# Patient Record
Sex: MALE | URBAN - METROPOLITAN AREA
[De-identification: names, ages, dates, MRNs, and addresses within clinical notes are randomized per-mention and may not be internally consistent; named-entity substitution may affect disease eponyms.]

---

## 2023-10-13 ENCOUNTER — LAB (OUTPATIENT)
Dept: LAB | Facility: LAB | Age: 28
End: 2023-10-13

## 2023-10-13 DIAGNOSIS — Z00.00 ENCOUNTER FOR GENERAL ADULT MEDICAL EXAMINATION WITHOUT ABNORMAL FINDINGS: Primary | ICD-10-CM

## 2023-10-13 PROCEDURE — 36415 COLL VENOUS BLD VENIPUNCTURE: CPT

## 2023-10-13 PROCEDURE — 86787 VARICELLA-ZOSTER ANTIBODY: CPT

## 2023-10-13 PROCEDURE — 86481 TB AG RESPONSE T-CELL SUSP: CPT

## 2023-10-13 PROCEDURE — 86765 RUBEOLA ANTIBODY: CPT

## 2023-10-13 PROCEDURE — 86735 MUMPS ANTIBODY: CPT

## 2023-10-13 PROCEDURE — 86317 IMMUNOASSAY INFECTIOUS AGENT: CPT

## 2023-10-13 PROCEDURE — 86706 HEP B SURFACE ANTIBODY: CPT

## 2023-10-14 LAB
HBV SURFACE AB SER-ACNC: >1000 MIU/ML
MEV IGG SER QL IA: POSITIVE
MUMPS IGG ANTIBODY INDEX: 1.2 IA
MUV IGG SER IA-ACNC: POSITIVE
RUBEOLA IGG ANTIBODY INDEX: 4.9 IA
RUBV IGG SERPL IA-ACNC: 1.7 IA
RUBV IGG SERPL QL IA: POSITIVE
VARICELLA ZOSTER IGG INDEX: 0.6 IA
VZV IGG SER QL IA: NEGATIVE

## 2023-10-15 LAB
NIL(NEG) CONTROL SPOT COUNT: NORMAL
PANEL A SPOT COUNT: 0
PANEL B SPOT COUNT: 0
POS CONTROL SPOT COUNT: NORMAL
T-SPOT. TB INTERPRETATION: NEGATIVE

## 2024-06-26 ENCOUNTER — APPOINTMENT (OUTPATIENT)
Dept: CARDIOLOGY | Facility: HOSPITAL | Age: 29
End: 2024-06-26
Payer: OTHER GOVERNMENT

## 2024-06-26 ENCOUNTER — HOSPITAL ENCOUNTER (EMERGENCY)
Facility: HOSPITAL | Age: 29
Discharge: HOME | End: 2024-06-26
Attending: EMERGENCY MEDICINE
Payer: OTHER GOVERNMENT

## 2024-06-26 VITALS
HEART RATE: 52 BPM | BODY MASS INDEX: 34.01 KG/M2 | SYSTOLIC BLOOD PRESSURE: 139 MMHG | HEIGHT: 74 IN | RESPIRATION RATE: 13 BRPM | TEMPERATURE: 97.5 F | WEIGHT: 265 LBS | DIASTOLIC BLOOD PRESSURE: 83 MMHG | OXYGEN SATURATION: 100 %

## 2024-06-26 DIAGNOSIS — H81.399 PERIPHERAL VERTIGO, UNSPECIFIED LATERALITY: Primary | ICD-10-CM

## 2024-06-26 LAB
ALBUMIN SERPL-MCNC: 4.8 G/DL (ref 3.5–5)
ALP BLD-CCNC: 78 U/L (ref 35–125)
ALT SERPL-CCNC: 45 U/L (ref 5–40)
ANION GAP SERPL CALC-SCNC: 10 MMOL/L
AST SERPL-CCNC: 30 U/L (ref 5–40)
ATRIAL RATE: 66 BPM
BASOPHILS # BLD AUTO: 0.08 X10*3/UL (ref 0–0.1)
BASOPHILS NFR BLD AUTO: 1 %
BILIRUB SERPL-MCNC: 0.3 MG/DL (ref 0.1–1.2)
BODY SURFACE AREA: 2.51 M2
BUN SERPL-MCNC: 15 MG/DL (ref 8–25)
CALCIUM SERPL-MCNC: 9.7 MG/DL (ref 8.5–10.4)
CHLORIDE SERPL-SCNC: 105 MMOL/L (ref 97–107)
CO2 SERPL-SCNC: 22 MMOL/L (ref 24–31)
CREAT SERPL-MCNC: 0.9 MG/DL (ref 0.4–1.6)
EGFRCR SERPLBLD CKD-EPI 2021: >90 ML/MIN/1.73M*2
EOSINOPHIL # BLD AUTO: 0.26 X10*3/UL (ref 0–0.7)
EOSINOPHIL NFR BLD AUTO: 3.2 %
ERYTHROCYTE [DISTWIDTH] IN BLOOD BY AUTOMATED COUNT: 13 % (ref 11.5–14.5)
GLUCOSE SERPL-MCNC: 115 MG/DL (ref 65–99)
HCT VFR BLD AUTO: 44.3 % (ref 41–52)
HGB BLD-MCNC: 14.7 G/DL (ref 13.5–17.5)
IMM GRANULOCYTES # BLD AUTO: 0.02 X10*3/UL (ref 0–0.7)
IMM GRANULOCYTES NFR BLD AUTO: 0.2 % (ref 0–0.9)
LYMPHOCYTES # BLD AUTO: 2.64 X10*3/UL (ref 1.2–4.8)
LYMPHOCYTES NFR BLD AUTO: 32.2 %
MCH RBC QN AUTO: 29.3 PG (ref 26–34)
MCHC RBC AUTO-ENTMCNC: 33.2 G/DL (ref 32–36)
MCV RBC AUTO: 88 FL (ref 80–100)
MONOCYTES # BLD AUTO: 0.65 X10*3/UL (ref 0.1–1)
MONOCYTES NFR BLD AUTO: 7.9 %
NEUTROPHILS # BLD AUTO: 4.56 X10*3/UL (ref 1.2–7.7)
NEUTROPHILS NFR BLD AUTO: 55.5 %
NRBC BLD-RTO: 0 /100 WBCS (ref 0–0)
P AXIS: 48 DEGREES
P OFFSET: 196 MS
P ONSET: 147 MS
PLATELET # BLD AUTO: 420 X10*3/UL (ref 150–450)
POTASSIUM SERPL-SCNC: 4.3 MMOL/L (ref 3.4–5.1)
PR INTERVAL: 148 MS
PROT SERPL-MCNC: 7.9 G/DL (ref 5.9–7.9)
Q ONSET: 221 MS
QRS COUNT: 11 BEATS
QRS DURATION: 92 MS
QT INTERVAL: 374 MS
QTC CALCULATION(BAZETT): 392 MS
QTC FREDERICIA: 386 MS
R AXIS: 100 DEGREES
RBC # BLD AUTO: 5.02 X10*6/UL (ref 4.5–5.9)
SODIUM SERPL-SCNC: 137 MMOL/L (ref 133–145)
T AXIS: -15 DEGREES
T OFFSET: 408 MS
VENTRICULAR RATE: 66 BPM
WBC # BLD AUTO: 8.2 X10*3/UL (ref 4.4–11.3)

## 2024-06-26 PROCEDURE — 99283 EMERGENCY DEPT VISIT LOW MDM: CPT

## 2024-06-26 PROCEDURE — 80053 COMPREHEN METABOLIC PANEL: CPT | Performed by: EMERGENCY MEDICINE

## 2024-06-26 PROCEDURE — 2500000004 HC RX 250 GENERAL PHARMACY W/ HCPCS (ALT 636 FOR OP/ED): Performed by: EMERGENCY MEDICINE

## 2024-06-26 PROCEDURE — 2500000001 HC RX 250 WO HCPCS SELF ADMINISTERED DRUGS (ALT 637 FOR MEDICARE OP): Performed by: EMERGENCY MEDICINE

## 2024-06-26 PROCEDURE — 96361 HYDRATE IV INFUSION ADD-ON: CPT

## 2024-06-26 PROCEDURE — 36415 COLL VENOUS BLD VENIPUNCTURE: CPT | Performed by: EMERGENCY MEDICINE

## 2024-06-26 PROCEDURE — 85025 COMPLETE CBC W/AUTO DIFF WBC: CPT | Performed by: EMERGENCY MEDICINE

## 2024-06-26 PROCEDURE — 93005 ELECTROCARDIOGRAM TRACING: CPT

## 2024-06-26 PROCEDURE — 93246 EXT ECG>7D<15D RECORDING: CPT

## 2024-06-26 PROCEDURE — 96360 HYDRATION IV INFUSION INIT: CPT

## 2024-06-26 RX ORDER — MECLIZINE HYDROCHLORIDE 25 MG/1
25 TABLET ORAL ONCE
Status: COMPLETED | OUTPATIENT
Start: 2024-06-26 | End: 2024-06-26

## 2024-06-26 RX ORDER — MECLIZINE HYDROCHLORIDE 25 MG/1
25 TABLET ORAL 3 TIMES DAILY PRN
Qty: 20 TABLET | Refills: 0 | Status: SHIPPED | OUTPATIENT
Start: 2024-06-26 | End: 2024-07-03

## 2024-06-26 ASSESSMENT — COLUMBIA-SUICIDE SEVERITY RATING SCALE - C-SSRS
6. HAVE YOU EVER DONE ANYTHING, STARTED TO DO ANYTHING, OR PREPARED TO DO ANYTHING TO END YOUR LIFE?: NO
2. HAVE YOU ACTUALLY HAD ANY THOUGHTS OF KILLING YOURSELF?: NO
1. IN THE PAST MONTH, HAVE YOU WISHED YOU WERE DEAD OR WISHED YOU COULD GO TO SLEEP AND NOT WAKE UP?: NO

## 2024-06-26 ASSESSMENT — PAIN SCALES - GENERAL: PAINLEVEL_OUTOF10: 0 - NO PAIN

## 2024-06-26 NOTE — ED NOTES
Md aware of change in heart rate.  Pt states he does not know resting heart rate.  Rate from 45-55.  Wife states generally in the 60/s. Md to see pt.  No further orders and discharge continued.  This nurse to ambulate for dizziness.      Mel Saldivar RN  06/26/24 3060

## 2024-06-26 NOTE — ED PROVIDER NOTES
HPI   Chief Complaint   Patient presents with    Dizziness       29-year-old male presents for chief complaint of dizziness for the past 3 to 4 days.  States this occurs when he turns his head or changes position but resolves when he remains still.  He does have a history of left ear surgery several years ago per wife where 2 bones were replaced and a graft was performed but he has not followed up since then.  Has not had any similar issues since then.  No recent URI symptoms.  No headache.  No focal weakness or numbness.      History provided by:  Patient and spouse                      [place] Coma Scale Score: 15         NIH Stroke Scale: 0             Patient History   No past medical history on file.  No past surgical history on file.  No family history on file.  Social History     Tobacco Use    Smoking status: Not on file    Smokeless tobacco: Not on file   Substance Use Topics    Alcohol use: Not on file    Drug use: Not on file       Physical Exam   ED Triage Vitals [06/26/24 0706]   Temperature Heart Rate Respirations BP   36.4 °C (97.5 °F) 78 16 (!) 141/92      Pulse Ox Temp Source Heart Rate Source Patient Position   100 % Oral -- --      BP Location FiO2 (%)     -- --       Physical Exam  Vitals and nursing note reviewed.     General: Vitals reviewed. Awake, alert, well-developed, well-nourished, NAD  HEENT: NC/AT, PERRL, MMM, right tympanic membrane nonerythematous, nonbulging, left tympanic membrane more difficult to assess, no erythema or bulge, some scarring present, normal external otic canals, no tenderness over the mastoids  Neck: Supple, trachea midline  Respiratory: No respiratory distress, lungs clear to auscultation bilaterally, no wheezes, rhonchi, or rales  CV: Regular rate and regular rhythm, no murmur/gallop/rubs  Abdomen/GI: Soft, non-tender, non-distended, no rebound, guarding, or rigidity, normal bowel sounds  Extremities: Moving all extremities, no deformities  Neuro: AAOx3, cranial  nerves intact, strength 5/5 x 4 extremities, sensation diffusely intact, no ataxia on extremeties, normal steady gait, normal test of skew   Skin: Warm, dry. No rashes identified    ED Course & MDM   ED Course as of 06/26/24 0818 Wed Jun 26, 2024   0714 EKG on my independent interpretation: Normal sinus rhythm 66 bpm, normal axis, normal intervals, nonspecific T wave inversion in 3, aVF, V5-V6 no prior EKG for comparison [FRANCESCA]      ED Course User Index  [FRANCESCA] Carolyn Sanon MD         Diagnoses as of 06/26/24 0818   Peripheral vertigo, unspecified laterality       Medical Decision Making  Patient presents with vertigo described as room spinning on position change abates when remaining still.  Consider central versus peripheral vertigo.  NIH stroke scale is 0 and there are no cerebellar findings on exam, normal test of skew and no truncal ataxia therefore based on history and physical exam do believe this is consistent with peripheral vertigo.   Considered imaging such as CT brain however given neurologic exam consistent with peripheral etiology do not feel this would benefit or .  Patient treated symptomatically with meclizine with improvement.  EKG without arrhythmia.  Labs without significant abnormality.  At this time do suspect peripheral vertigo possibly BPPV will treat with meclizine and refer to ENT for follow-up.  Return precautions discussed.    Amount and/or Complexity of Data Reviewed  Labs: ordered. Decision-making details documented in ED Course.  ECG/medicine tests: ordered and independent interpretation performed. Decision-making details documented in ED Course.        Procedure  Procedures     Carolyn Sanon MD  06/26/24 0818

## 2024-06-26 NOTE — Clinical Note
Eddie Mobley was seen and treated in our emergency department on 6/26/2024.  He may return to work on 06/27/2024.       If you have any questions or concerns, please don't hesitate to call.      Carolyn Sanon MD

## 2024-06-26 NOTE — ED NOTES
"Pt ambulated.  Mild but improved dizziness vs when came in.  Pt continued on monitor through walk.  Heart rate increased with standing to 70/s.  Following ambulation to bathroom, less then 50 feet,pt heart rate 120's.  Pt has dizziness but denies feeling of being winded or palpitations.  Pt returned to bed.  Repeat blood pressure shows not change and when sitting HR returned to 48.     MD updated.  No further orders and MD continues to feel confirmable with discharge. \"{ I do not think this is related to his visit.\"  Permission requested to [place] of [place] fluids obtained.  Pt and family updated and new bag of fluids. 1L NS placed in pressure bag.  Will continue to monitor and repeat ambulation following fluids.       Mel Saldivar RN  06/26/24 0471    "

## 2024-06-26 NOTE — ED NOTES
Assumed care of pt.  MD at bedside to assess pt.  Family at bedside.      Mel Saldivar RN  06/26/24 0768

## 2024-06-26 NOTE — DISCHARGE INSTRUCTIONS
The cause of your symptoms appears consistent with vertigo which is often caused by inner ear problem.  This could be due to excessive fluid or inflammation from nasal congestion or cold-like symptom or crystals that can develop in your inner ear.  It does not appear to be related to anything more concerning in brain such as a stroke given your normal neurological exam.  I recommend a daily decongestant such as Benadryl or Zyrtec.  You may also take meclizine as needed for symptoms of dizziness.  Return immediately if you develop dizziness associated with focal area of weakness, numbness, tingling in her extremities problems with vision or speech.  Follow-up with your family doctor or ear nose and throat physician as indicated below if symptoms persist.

## 2024-06-26 NOTE — ED NOTES
Wife voicing concern/request to involve cardiology.  Request made to MD to have zio patch placed and cardiology follow up.  MD agreed to place order.  MD voices no further orders beyond and confirms request for dc.  Family updated.  Fluids continued.  Pending repeat ambulation and zio patch,.      Mel Saldivar RN  06/26/24 2539